# Patient Record
Sex: MALE | Race: WHITE | Employment: OTHER | URBAN - METROPOLITAN AREA
[De-identification: names, ages, dates, MRNs, and addresses within clinical notes are randomized per-mention and may not be internally consistent; named-entity substitution may affect disease eponyms.]

---

## 2021-08-30 PROBLEM — E78.5 DYSLIPIDEMIA: Status: ACTIVE | Noted: 2021-08-26

## 2021-08-30 PROBLEM — I71.40 ABDOMINAL AORTIC ANEURYSM (AAA) WITHOUT RUPTURE: Status: ACTIVE | Noted: 2021-08-26

## 2021-08-30 PROBLEM — J12.82 PNEUMONIA DUE TO COVID-19 VIRUS: Status: ACTIVE | Noted: 2021-08-20

## 2021-08-30 PROBLEM — U07.1 PNEUMONIA DUE TO COVID-19 VIRUS: Status: ACTIVE | Noted: 2021-08-20

## 2022-02-08 PROBLEM — I10 PRIMARY HYPERTENSION: Status: ACTIVE | Noted: 2022-02-08

## 2022-02-08 PROBLEM — G47.33 OSA (OBSTRUCTIVE SLEEP APNEA): Status: ACTIVE | Noted: 2022-02-08

## 2022-02-08 PROBLEM — N52.9 ERECTILE DYSFUNCTION: Status: ACTIVE | Noted: 2022-02-08

## 2022-02-08 PROBLEM — I25.10 CORONARY ARTERY DISEASE INVOLVING NATIVE CORONARY ARTERY OF NATIVE HEART WITHOUT ANGINA PECTORIS: Status: ACTIVE | Noted: 2022-02-08

## 2022-02-08 PROBLEM — R91.8 PULMONARY NODULES: Status: ACTIVE | Noted: 2022-02-08

## 2022-02-08 PROBLEM — E11.9 DIABETES MELLITUS (HCC): Status: ACTIVE | Noted: 2022-02-08

## 2022-05-02 ENCOUNTER — TELEPHONE (OUTPATIENT)
Dept: UROLOGY | Age: 67
End: 2022-05-02

## 2022-05-02 NOTE — TELEPHONE ENCOUNTER
----- Message from Oumar Alvarenga PA-C sent at 5/2/2022  9:19 AM EDT -----  Please let him know that his testosterone level was 456, we will continue current dosing

## 2022-07-27 ENCOUNTER — TELEPHONE (OUTPATIENT)
Dept: UROLOGY | Age: 67
End: 2022-07-27

## 2022-07-27 DIAGNOSIS — E29.1 HYPOGONADISM IN MALE: ICD-10-CM

## 2022-07-27 RX ORDER — TESTOSTERONE CYPIONATE 200 MG/ML
100 INJECTION INTRAMUSCULAR WEEKLY
Qty: 6 ML | Refills: 0 | Status: SHIPPED | OUTPATIENT
Start: 2022-07-27 | End: 2022-09-08

## 2022-09-08 DIAGNOSIS — E29.1 HYPOGONADISM IN MALE: ICD-10-CM

## 2022-09-08 RX ORDER — TESTOSTERONE CYPIONATE 200 MG/ML
100 INJECTION INTRAMUSCULAR WEEKLY
Qty: 3 ML | Refills: 0 | Status: SHIPPED | OUTPATIENT
Start: 2022-09-08 | End: 2022-10-24 | Stop reason: SDUPTHER

## 2022-09-08 NOTE — TELEPHONE ENCOUNTER
Patient was seen within the past 2 months. Patient has an upcoming appointment next month.   We are continuing this dosing of testosterone cypionate

## 2023-03-08 PROBLEM — I71.21 ANEURYSM OF ASCENDING AORTA WITHOUT RUPTURE (HCC): Status: ACTIVE | Noted: 2023-03-08

## 2023-03-08 PROBLEM — U07.1 PNEUMONIA DUE TO COVID-19 VIRUS: Status: RESOLVED | Noted: 2021-08-20 | Resolved: 2023-03-08

## 2023-03-08 PROBLEM — J12.82 PNEUMONIA DUE TO COVID-19 VIRUS: Status: RESOLVED | Noted: 2021-08-20 | Resolved: 2023-03-08

## 2023-05-22 PROBLEM — Z86.010 HX OF COLONIC POLYPS: Status: ACTIVE | Noted: 2023-05-22

## 2023-05-22 PROBLEM — Z86.0100 HX OF COLONIC POLYPS: Status: ACTIVE | Noted: 2023-05-22

## 2023-08-21 ENCOUNTER — TELEPHONE (OUTPATIENT)
Dept: UROLOGY | Age: 68
End: 2023-08-21

## 2023-08-21 DIAGNOSIS — Z51.81 ENCOUNTER FOR MEDICATION MONITORING: Primary | ICD-10-CM

## 2023-08-21 NOTE — TELEPHONE ENCOUNTER
Please let him know that his testosterone is still over 1000, prior to having to lower his dose please have him have another testosterone drawn 4 to 5 days after his weekly dosing at 8 AM, if it remains high we do have to lower the dosing.

## 2023-09-19 PROBLEM — E11.65 INADEQUATELY CONTROLLED DIABETES MELLITUS (HCC): Status: ACTIVE | Noted: 2022-02-08

## 2024-01-12 ENCOUNTER — OFFICE VISIT (OUTPATIENT)
Dept: VASCULAR SURGERY | Age: 69
End: 2024-01-12
Payer: COMMERCIAL

## 2024-01-12 VITALS
DIASTOLIC BLOOD PRESSURE: 80 MMHG | RESPIRATION RATE: 20 BRPM | TEMPERATURE: 97.5 F | BODY MASS INDEX: 26.46 KG/M2 | WEIGHT: 168.6 LBS | SYSTOLIC BLOOD PRESSURE: 128 MMHG | HEIGHT: 67 IN | HEART RATE: 69 BPM

## 2024-01-12 DIAGNOSIS — I72.8 ANEURYSM OF CELIAC ARTERY (HCC): Primary | ICD-10-CM

## 2024-01-12 DIAGNOSIS — I71.21 ANEURYSM OF ASCENDING AORTA WITHOUT RUPTURE (HCC): ICD-10-CM

## 2024-01-12 PROCEDURE — 3074F SYST BP LT 130 MM HG: CPT | Performed by: INTERNAL MEDICINE

## 2024-01-12 PROCEDURE — 3017F COLORECTAL CA SCREEN DOC REV: CPT | Performed by: INTERNAL MEDICINE

## 2024-01-12 PROCEDURE — 3079F DIAST BP 80-89 MM HG: CPT | Performed by: INTERNAL MEDICINE

## 2024-01-12 PROCEDURE — 1123F ACP DISCUSS/DSCN MKR DOCD: CPT | Performed by: INTERNAL MEDICINE

## 2024-01-12 PROCEDURE — G8427 DOCREV CUR MEDS BY ELIG CLIN: HCPCS | Performed by: INTERNAL MEDICINE

## 2024-01-12 PROCEDURE — G8417 CALC BMI ABV UP PARAM F/U: HCPCS | Performed by: INTERNAL MEDICINE

## 2024-01-12 PROCEDURE — 99204 OFFICE O/P NEW MOD 45 MIN: CPT | Performed by: INTERNAL MEDICINE

## 2024-01-12 PROCEDURE — 1036F TOBACCO NON-USER: CPT | Performed by: INTERNAL MEDICINE

## 2024-01-12 PROCEDURE — G8484 FLU IMMUNIZE NO ADMIN: HCPCS | Performed by: INTERNAL MEDICINE

## 2024-01-12 NOTE — PROGRESS NOTES
Tato Hanna was seen on 1/12/2024 for   Chief Complaint   Patient presents with    New Patient     New patient-here for aneurysm of ascending aorta. Previous patient of Dr. Vogt. Patient denies any vascular related symptoms at this time.   .                            REVIEW OF SYSTEMS    Constitutional Weight: absent, Fatigue: absent Fever: absent   HEENT Ears: normal,Visual disturbance: absent Sore throat: absent,    Respiratory Shortness of breath: absent, Cough: absent;, Snoring: present   Cardivascular Chest pain: absent,  Leg pain: absent,Leg swelling:absent, Non-healing wound:absent    GI Diarrhea: present, Abdominal Pain: absent    Urinary frequency: absent, Urinary incontinence: absent   Musculoskeletal Neck pain: present, Back pain: absent, Restless Leg:absent     Dermatological Hair loss: absent, Skin changes: absent   Neurological  Sudden Loss of Vision in one eye:absent, Slurred Speech:absent, Weakness on one side of body: absent,Headache: absent   Psychiatric Anxiety: present, Depression: present   Hematologic Abnormal bleeding: absent,          
celiac artery (HCC)    2. Aneurysm of ascending aorta without rupture (HCC)         Plan of care:  CTA chest April 2024 [facility]  RTC May 2024  Then will increase interval  45 min chart review and pt encounter  Follow up and evaluate.       Electronically signed by Vinicio Perez MD on 1/12/24 at 10:41 AM EST

## 2024-01-12 NOTE — PATIENT INSTRUCTIONS
SURVEY:    Thank you for allowing us to care for you today.    You may be receiving a survey from [facility] regarding your visit today- electronically or via mail.      Please help us by completing the survey as this will provide the needed feedback to ensure we are providing the very best care for you and your family.    If you cannot score us a very good on any question, please call the office to discuss how we could have made your experience a very good one.    Thank you.       STAFF:    Ely Donahue, Bree Rasmussen, Wandy Vogt      CLINICAL STAFF:    Rupinder Powell LPN, Lazara Sanchez LPN, Camelia Pineda LPN, Danya Hopper CMA

## 2024-04-02 PROBLEM — E11.69 TYPE 2 DIABETES MELLITUS WITH OTHER SPECIFIED COMPLICATION, WITHOUT LONG-TERM CURRENT USE OF INSULIN (HCC): Status: ACTIVE | Noted: 2022-02-08

## 2024-05-15 ENCOUNTER — OFFICE VISIT (OUTPATIENT)
Dept: VASCULAR SURGERY | Age: 69
End: 2024-05-15
Payer: COMMERCIAL

## 2024-05-15 VITALS
TEMPERATURE: 97.6 F | SYSTOLIC BLOOD PRESSURE: 133 MMHG | HEIGHT: 67 IN | WEIGHT: 166.3 LBS | HEART RATE: 63 BPM | DIASTOLIC BLOOD PRESSURE: 79 MMHG | BODY MASS INDEX: 26.1 KG/M2

## 2024-05-15 DIAGNOSIS — I71.21 ANEURYSM OF ASCENDING AORTA WITHOUT RUPTURE (HCC): Primary | ICD-10-CM

## 2024-05-15 PROCEDURE — 3078F DIAST BP <80 MM HG: CPT | Performed by: INTERNAL MEDICINE

## 2024-05-15 PROCEDURE — 1123F ACP DISCUSS/DSCN MKR DOCD: CPT | Performed by: INTERNAL MEDICINE

## 2024-05-15 PROCEDURE — G8427 DOCREV CUR MEDS BY ELIG CLIN: HCPCS | Performed by: INTERNAL MEDICINE

## 2024-05-15 PROCEDURE — 1036F TOBACCO NON-USER: CPT | Performed by: INTERNAL MEDICINE

## 2024-05-15 PROCEDURE — 3017F COLORECTAL CA SCREEN DOC REV: CPT | Performed by: INTERNAL MEDICINE

## 2024-05-15 PROCEDURE — G8417 CALC BMI ABV UP PARAM F/U: HCPCS | Performed by: INTERNAL MEDICINE

## 2024-05-15 PROCEDURE — 99214 OFFICE O/P EST MOD 30 MIN: CPT | Performed by: INTERNAL MEDICINE

## 2024-05-15 PROCEDURE — 3075F SYST BP GE 130 - 139MM HG: CPT | Performed by: INTERNAL MEDICINE

## 2024-05-15 NOTE — PROGRESS NOTES
Tato Hanna was seen on 5/15/2024 for   Chief Complaint   Patient presents with    Aneurysm of celiac artery     3 month follow up. Completed CTA chest on 4/10/24. Patient reports increased fatigue.   .                            REVIEW OF SYSTEMS    Constitutional Weight: absent, A, Fatigue: present Fever: absent   HEENT Ears: normal,Visual disturbance: absent Sore throat: absent,    Respiratory Shortness of breath: absent, Cough: absent;, Snoring: absent   Cardivascular Chest pain: absent,  Leg pain: absent,Leg swelling:absent, Non-healing wound:absent    GI Diarrhea: present, Abdominal Pain: absent    Urinary frequency: absent, Urinary incontinence: absent   Musculoskeletal Neck pain: present, Back pain: absent, Restless Leg:absent     Dermatological Hair loss: absent, Skin changes: absent   Neurological  Sudden Loss of Vision in one eye:absent, Slurred Speech:absent, Weakness on one side of body: absent,Headache: absent   Psychiatric Anxiety: absent, Depression: absent   Hematologic Abnormal bleeding: absent,          
daily      MAGNESIUM PO Take 133 mg by mouth daily      testosterone cypionate (DEPOTESTOTERONE CYPIONATE) 200 MG/ML injection Inject 0.35 mLs into the muscle once a week for 90 days. Max Daily Amount: 70 mg 5 mL 0    pioglitazone (ACTOS) 30 MG tablet TAKE 1 TABLET BY MOUTH EVERY DAY 90 tablet 2    metoprolol succinate (TOPROL XL) 50 MG extended release tablet TAKE 1 TABLET BY MOUTH EVERY DAY 90 tablet 1    lisinopril (PRINIVIL;ZESTRIL) 40 MG tablet TAKE 1 TABLET BY MOUTH EVERY DAY 90 tablet 3    atorvastatin (LIPITOR) 80 MG tablet TAKE 1 TABLET BY MOUTH EVERYDAY AT BEDTIME 90 tablet 1    dilTIAZem (CARDIZEM CD) 240 MG extended release capsule TAKE 1 CAPSULE BY MOUTH EVERY DAY 90 capsule 1    JARDIANCE 10 MG tablet TAKE 1 TABLET BY MOUTH DAILY STOP THE JANUVIA WHEN STARTING THIS MEDICATION 90 tablet 1    sildenafil (REVATIO) 20 MG tablet take 1 (ONE) to 5 (FIVE) TABLETS AS NEEDED for erectile dysfunction. Do not take EVERY DAY 90 tablet 1    omeprazole (PRILOSEC) 20 MG delayed release capsule TAKE 1 CAPSULE BY MOUTH EVERY DAY 90 capsule 1    metFORMIN (GLUCOPHAGE-XR) 500 MG extended release tablet TAKE 2 TABLETS BY MOUTH DAILY (WITH BREAKFAST) STOP METFORMIN IMMEDIATE RELEASE 180 tablet 2    Ascorbic Acid (VITAMIN C) 1000 MG tablet Take 1 tablet by mouth as needed      turmeric 500 MG CAPS Take 2 capsules by mouth daily      Chlorpheniramine Maleate (CHLOR-TRIMETON ALLERGY PO) Take 1 tablet by mouth daily as needed      Multiple Vitamins-Minerals (THERAPEUTIC MULTIVITAMIN-MINERALS) tablet Take 1 tablet by mouth daily      aspirin EC 81 MG EC tablet Take 1 tablet by mouth daily      Coconut Oil 1000 MG CAPS Take by mouth daily      blood glucose monitor strips 1 strip by Other route Test one time a day & as needed for symptoms of irregular blood glucose. Dispense sufficient amount for indicated testing frequency plus additional to accommodate PRN testing needs.      CINNAMON PO Take 1,500 mg by mouth daily

## 2024-05-15 NOTE — PATIENT INSTRUCTIONS
SURVEY:    Thank you for allowing us to care for you today.    You may be receiving a survey from [facility] regarding your visit today- electronically or via mail.      Please help us by completing the survey as this will provide the needed feedback to ensure we are providing the very best care for you and your family.    If you cannot score us a very good on any question, please call the office to discuss how we could have made your experience a very good one.    Thank you.       STAFF:    Ely Donahue, Bree Rasmussen, Wandy Vogt      CLINICAL STAFF:    Rupinder Powell LPN, Lazara Sanchez LPN, Camelia Pineda LPN, Danya Hopper CMA

## 2025-05-21 ENCOUNTER — OFFICE VISIT (OUTPATIENT)
Dept: VASCULAR SURGERY | Age: 70
End: 2025-05-21
Payer: COMMERCIAL

## 2025-05-21 VITALS
SYSTOLIC BLOOD PRESSURE: 124 MMHG | TEMPERATURE: 96.3 F | HEIGHT: 67 IN | RESPIRATION RATE: 18 BRPM | DIASTOLIC BLOOD PRESSURE: 75 MMHG | WEIGHT: 165 LBS | BODY MASS INDEX: 25.9 KG/M2 | HEART RATE: 66 BPM

## 2025-05-21 DIAGNOSIS — I71.21 ANEURYSM OF ASCENDING AORTA WITHOUT RUPTURE: Primary | ICD-10-CM

## 2025-05-21 PROCEDURE — 1159F MED LIST DOCD IN RCRD: CPT | Performed by: INTERNAL MEDICINE

## 2025-05-21 PROCEDURE — 1123F ACP DISCUSS/DSCN MKR DOCD: CPT | Performed by: INTERNAL MEDICINE

## 2025-05-21 PROCEDURE — 3074F SYST BP LT 130 MM HG: CPT | Performed by: INTERNAL MEDICINE

## 2025-05-21 PROCEDURE — 1126F AMNT PAIN NOTED NONE PRSNT: CPT | Performed by: INTERNAL MEDICINE

## 2025-05-21 PROCEDURE — 3078F DIAST BP <80 MM HG: CPT | Performed by: INTERNAL MEDICINE

## 2025-05-21 PROCEDURE — G8427 DOCREV CUR MEDS BY ELIG CLIN: HCPCS | Performed by: INTERNAL MEDICINE

## 2025-05-21 PROCEDURE — 1036F TOBACCO NON-USER: CPT | Performed by: INTERNAL MEDICINE

## 2025-05-21 PROCEDURE — 3017F COLORECTAL CA SCREEN DOC REV: CPT | Performed by: INTERNAL MEDICINE

## 2025-05-21 PROCEDURE — G8417 CALC BMI ABV UP PARAM F/U: HCPCS | Performed by: INTERNAL MEDICINE

## 2025-05-21 PROCEDURE — 99214 OFFICE O/P EST MOD 30 MIN: CPT | Performed by: INTERNAL MEDICINE

## 2025-05-21 NOTE — PROGRESS NOTES
Tato Hanna was seen on 5/21/2025 for   Chief Complaint   Patient presents with    Aneurysm of ascending aorta     1 year follow up. Completed CTA. Patient denies any vascular related symptoms.                                REVIEW OF SYSTEMS    Constitutional Weight: absent, A, Fatigue: absent Fever: absent   HEENT Ears: normal,Visual disturbance: absent Sore throat: absent,    Respiratory Shortness of breath: absent, Cough: absent;, Snoring: absent   Cardivascular Chest pain: absent,  Leg pain: absent,Leg swelling:absent, Non-healing wound:absent    GI Diarrhea: absent, Abdominal Pain: absent    Urinary frequency: absent, Urinary incontinence: absent   Musculoskeletal Neck pain: absent, Back pain: absent, Restless Leg:absent     Dermatological Hair loss: absent, Skin changes: absent   Neurological  Sudden Loss of Vision in one eye:absent, Slurred Speech:absent, Weakness on one side of body: absent,Headache: absent   Psychiatric Anxiety: absent, Depression: absent   Hematologic Abnormal bleeding: absent,

## 2025-05-21 NOTE — PROGRESS NOTES
Tato Hanna was seen on 2025 for   Chief Complaint   Patient presents with    Aneurysm of ascending aorta     1 year follow up. Completed CTA. Patient denies any vascular related symptoms.    .      [address] [place] [address] vascular visit. Referred by Felicia Deleon CNP. Had followed with Dr Vogt for TAA. Had Covid and a CT chest was performed. Incidental finding of TAA. 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 Repeat CT 23 4.4 cm. Lisinopril, atorva, former smoker. 128/80 today.   [place] david incl [place], gives away venison.  Cuts and burns wood, has grandkids.   Dr Vogt cautioned to lift no more than 30#. I relaxed that. No power lifting. Should be able to lift 50# from time to time.   Retired from car body work, still sells some.   No claudication.   UPDATE 5/15/24 No significant chest pain. Still walks without sx. 4/10/24 root reported at 5.4, ascending 4.4x4.5. I reviewed images and the 5.4 dimension appears to be elongated somewhat. The ascending dimension is unchanged from prior and appears accurate.  We also discussed significance of cor a calcium, risk factor modification, etc  UPDATE 25 Still has no sx. CT 25 ascending grew slightly to 4.6x4.6. Root increased in size to 5.7 (I reviewed images and cut is somewhat elongated). Prior was 5.4 (report incorrectly says 1.4) Limiting lifting to 50#.   Reloads.        Family History   Problem Relation Age of Onset    Diabetes Mother     Coronary Art Dis Mother          age 75    Cancer Father         lung    High Cholesterol Sister     Diabetes Brother     Thyroid Disease Brother         2011    Heart Disease Brother         Triple bypass age 66     Past Medical History:   Diagnosis Date    Allergic rhinitis     Aortic aneurysm     COVID 2021    Had a 7 day hospital stay    Depression     Diabetes mellitus (HCC)     Diverticulosis of colon 2023    Incidental finding    Erectile dysfunction     GERD

## 2025-05-21 NOTE — PATIENT INSTRUCTIONS
SURVEY:    Thank you for allowing us to care for you today.    You may be receiving a survey from [facility] regarding your visit today- electronically or via mail.      Please help us by completing the survey as this will provide the needed feedback to ensure we are providing the very best care for you and your family.    If you cannot score us a very good on any question, please call the office to discuss how we could have made your experience a very good one.    Thank you.       STAFF:    Letha Giron, Wandy JIMENEZ      CLINICAL STAFF:    Rupinder EDDY, Ely JIMENEZ, Danya JIMENEZ, Lazara EDDY

## 2025-05-28 ENCOUNTER — OFFICE VISIT (OUTPATIENT)
Dept: VASCULAR SURGERY | Age: 70
End: 2025-05-28
Payer: COMMERCIAL

## 2025-05-28 VITALS
HEART RATE: 75 BPM | HEIGHT: 67 IN | BODY MASS INDEX: 25.79 KG/M2 | DIASTOLIC BLOOD PRESSURE: 76 MMHG | SYSTOLIC BLOOD PRESSURE: 126 MMHG | TEMPERATURE: 98 F | WEIGHT: 164.3 LBS | RESPIRATION RATE: 18 BRPM

## 2025-05-28 DIAGNOSIS — I71.21 ANEURYSM OF ASCENDING AORTA WITHOUT RUPTURE: Primary | ICD-10-CM

## 2025-05-28 PROCEDURE — 1159F MED LIST DOCD IN RCRD: CPT | Performed by: SURGERY

## 2025-05-28 PROCEDURE — 1036F TOBACCO NON-USER: CPT | Performed by: SURGERY

## 2025-05-28 PROCEDURE — 1123F ACP DISCUSS/DSCN MKR DOCD: CPT | Performed by: SURGERY

## 2025-05-28 PROCEDURE — 3078F DIAST BP <80 MM HG: CPT | Performed by: SURGERY

## 2025-05-28 PROCEDURE — G8427 DOCREV CUR MEDS BY ELIG CLIN: HCPCS | Performed by: SURGERY

## 2025-05-28 PROCEDURE — 3017F COLORECTAL CA SCREEN DOC REV: CPT | Performed by: SURGERY

## 2025-05-28 PROCEDURE — 3074F SYST BP LT 130 MM HG: CPT | Performed by: SURGERY

## 2025-05-28 PROCEDURE — 99203 OFFICE O/P NEW LOW 30 MIN: CPT | Performed by: SURGERY

## 2025-05-28 PROCEDURE — G8417 CALC BMI ABV UP PARAM F/U: HCPCS | Performed by: SURGERY

## 2025-05-28 PROCEDURE — 1126F AMNT PAIN NOTED NONE PRSNT: CPT | Performed by: SURGERY

## 2025-05-28 NOTE — PROGRESS NOTES
[facility] PHYSIC[facility]ANS [facility] VASCULAR PART OF [address]  [facility] [address]  Dept: [phone]     Patient: Tato Hanna  : 1955  MRN: [id number]  DOS: 2025    Referring provider:  No ref. provider found         HPI:  Tato Hanna is a 69 y.o. male who comes to the office for the first time regarding his ascending aortic aneurysm.  The aneurysm measures approximately 45 mm in its greatest diameter.  He has a trileaflet aortic valve on echo in .  He has no chest pain.  He has no claudication rest pain or ulceration.  He is diabetic with high blood pressure and high cholesterol.  He is on the appropriate blood pressure medications and takes atorvastatin and aspirin.  He is not smoking.  Past Medical History:   Diagnosis Date    Allergic rhinitis     Aortic aneurysm     COVID 2021    Had a 7 day hospital stay    Depression     Diabetes mellitus (HCC)     Diverticulosis of colon 2023    Incidental finding    Erectile dysfunction     GERD (gastroesophageal reflux disease)     Hypercalcemia     Hyperlipidemia     Hypertension     Hypertrophic disorder of skin     Lung nodule     Pneumonia due to COVID-19 virus 2021    Last Assessment & Plan:  Formatting of this note might be different from the original. Management as per [facility].    Sleep apnea     obstructive     Testicular hypofunction      Family History   Problem Relation Age of Onset    Diabetes Mother     Coronary Art Dis Mother          age 75    Cancer Father         lung    High Cholesterol Sister     Diabetes Brother     Thyroid Disease Brother         2011    Heart Disease Brother         Triple bypass age 66      Social History     Socioeconomic History    Marital status:      Spouse name: Not on file    Number of children: Not on file    Years of education: Not on file    Highest education